# Patient Record
Sex: FEMALE | Race: WHITE | URBAN - METROPOLITAN AREA
[De-identification: names, ages, dates, MRNs, and addresses within clinical notes are randomized per-mention and may not be internally consistent; named-entity substitution may affect disease eponyms.]

---

## 2017-01-04 ENCOUNTER — HOSPITAL ENCOUNTER (OUTPATIENT)
Dept: PHYSICAL THERAPY | Age: 52
Setting detail: THERAPIES SERIES
Discharge: HOME | End: 2017-01-04
Payer: COMMERCIAL

## 2017-01-04 PROCEDURE — 97110 THERAPEUTIC EXERCISES: CPT

## 2017-01-04 PROCEDURE — G8979 MOBILITY GOAL STATUS: HCPCS

## 2017-01-04 PROCEDURE — 97530 THERAPEUTIC ACTIVITIES: CPT

## 2017-01-04 PROCEDURE — G8980 MOBILITY D/C STATUS: HCPCS

## 2017-01-04 ASSESSMENT — PAIN DESCRIPTION - PAIN TYPE: TYPE: ACUTE PAIN

## 2017-01-04 ASSESSMENT — PAIN DESCRIPTION - ORIENTATION: ORIENTATION: RIGHT

## 2017-01-04 ASSESSMENT — PAIN DESCRIPTION - LOCATION: LOCATION: BUTTOCKS;LEG

## 2017-01-04 ASSESSMENT — PAIN SCALES - GENERAL: PAINLEVEL_OUTOF10: 3

## 2023-11-15 ENCOUNTER — OFFICE VISIT (OUTPATIENT)
Dept: ORTHOPEDIC SURGERY | Facility: CLINIC | Age: 58
End: 2023-11-15
Payer: COMMERCIAL

## 2023-11-15 DIAGNOSIS — M25.562 ACUTE PAIN OF LEFT KNEE: ICD-10-CM

## 2023-11-15 DIAGNOSIS — M17.12 PRIMARY OSTEOARTHRITIS OF LEFT KNEE: Primary | ICD-10-CM

## 2023-11-15 PROCEDURE — 2500000005 HC RX 250 GENERAL PHARMACY W/O HCPCS: Performed by: ORTHOPAEDIC SURGERY

## 2023-11-15 PROCEDURE — 2500000004 HC RX 250 GENERAL PHARMACY W/ HCPCS (ALT 636 FOR OP/ED): Performed by: ORTHOPAEDIC SURGERY

## 2023-11-15 PROCEDURE — 20610 DRAIN/INJ JOINT/BURSA W/O US: CPT | Performed by: ORTHOPAEDIC SURGERY

## 2023-11-15 PROCEDURE — L1812 KO ELASTIC W/JOINTS PRE OTS: HCPCS | Performed by: ORTHOPAEDIC SURGERY

## 2023-11-15 RX ORDER — LIDOCAINE HYDROCHLORIDE 10 MG/ML
5 INJECTION INFILTRATION; PERINEURAL
Status: COMPLETED | OUTPATIENT
Start: 2023-11-15 | End: 2023-11-15

## 2023-11-15 RX ORDER — BETAMETHASONE SODIUM PHOSPHATE AND BETAMETHASONE ACETATE 3; 3 MG/ML; MG/ML
12 INJECTION, SUSPENSION INTRA-ARTICULAR; INTRALESIONAL; INTRAMUSCULAR; SOFT TISSUE
Status: COMPLETED | OUTPATIENT
Start: 2023-11-15 | End: 2023-11-15

## 2023-11-15 RX ADMIN — BETAMETHASONE ACETATE AND BETAMETHASONE SODIUM PHOSPHATE 12 MG: 3; 3 INJECTION, SUSPENSION INTRA-ARTICULAR; INTRALESIONAL; INTRAMUSCULAR; SOFT TISSUE at 08:42

## 2023-11-15 RX ADMIN — LIDOCAINE HYDROCHLORIDE 5 ML: 10 INJECTION, SOLUTION INFILTRATION; PERINEURAL at 08:42

## 2023-11-15 NOTE — PROGRESS NOTES
History: Mónica is here for her left knee.  She has pain in the left knee for the last 3 to 4 months.  She denies any acute trauma.  Hurts mainly on the top and outside of the knee.  She was trying some topical medicine but was unable to take anti-inflammatories due to kidney issues.    Past medical history: Multiple  Medications: Multiple  Allergies: No known drug allergies    Please refer to the intake H&P regarding the patient's review of systems, family history and social history as was done today    HEENT: Normal  Lungs: Clear to auscultation  Heart: RRR  Abdomen: Soft, nontender  Skin: clear  Extremity: She has tenderness around the patella especially anteriorly and laterally.  No specific pain over the medial or lateral joint line.  Negative Lachman and posterior drawer.  Mild laxity with varus stress.  Negative Jose Cruz's maneuver.  There is 1+ crepitus with patellofemoral motion.  No numbness.  Contralateral exam is normal for strength, motion, stability and neurovascular assessment.    Radiographs: X-rays and MRI from [facility] show decent joint space and alignment.  There is some moderate patellofemoral wear and fissuring in the lateral facet.  No meniscal or ligamentous injury.    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    Plan: She seems to aggravated her patellofemoral mechanism and does have some patellar DJD.  We discussed several options and she would willing to try a free sport brace and an injection as again she cannot take anti-inflammatories.  We will see her back over the next 6 weeks for recheck if not improving.  We can certainly set her therapy, viscosupplementation or other modalities as needed.  L Inj/Asp: L knee on 11/15/2023 8:42 AM  Indications: pain  Details: 22 G needle, lateral approach  Medications: 5 mL lidocaine 10 mg/mL (1 %); 12 mg betamethasone acet,sod phos 6 mg/mL  Outcome: tolerated well, no immediate complications  Procedure,  treatment alternatives, risks and benefits explained, specific risks discussed. Consent was given by the patient. Immediately prior to procedure a time out was called to verify the correct patient, procedure, equipment, support staff and site/side marked as required. Patient was prepped and draped in the usual sterile fashion.          All questions were answered today with the patient.    This note was generated with voice recognition software and may contain grammatical errors.

## 2023-12-27 ENCOUNTER — APPOINTMENT (OUTPATIENT)
Dept: ORTHOPEDIC SURGERY | Facility: CLINIC | Age: 58
End: 2023-12-27
Payer: COMMERCIAL

## 2024-04-19 ENCOUNTER — CLINICAL SUPPORT (OUTPATIENT)
Dept: AUDIOLOGY | Facility: CLINIC | Age: 59
End: 2024-04-19
Payer: COMMERCIAL

## 2024-04-19 ENCOUNTER — OFFICE VISIT (OUTPATIENT)
Dept: OTOLARYNGOLOGY | Facility: CLINIC | Age: 59
End: 2024-04-19
Payer: COMMERCIAL

## 2024-04-19 VITALS — BODY MASS INDEX: 36.62 KG/M2 | WEIGHT: 199 LBS | HEIGHT: 62 IN

## 2024-04-19 DIAGNOSIS — H90.3 BILATERAL SENSORINEURAL HEARING LOSS: Primary | ICD-10-CM

## 2024-04-19 PROCEDURE — 1036F TOBACCO NON-USER: CPT | Performed by: OTOLARYNGOLOGY

## 2024-04-19 PROCEDURE — 99203 OFFICE O/P NEW LOW 30 MIN: CPT | Performed by: OTOLARYNGOLOGY

## 2024-04-19 PROCEDURE — 92567 TYMPANOMETRY: CPT | Performed by: AUDIOLOGIST

## 2024-04-19 PROCEDURE — 92557 COMPREHENSIVE HEARING TEST: CPT | Performed by: AUDIOLOGIST

## 2024-04-19 NOTE — PROGRESS NOTES
Subjective   Patient ID: Mónica Zavala is a 58 y.o. female who presents for Hearing Loss.    HPI  Patient presents to the office today for assessment of ears and hearing.  Patient with concern for left greater than right hearing loss.  Patient reports symptoms have been gradual but worsened after COVID back in 2020.  Patient is without other ENT related concerns at this time.      Review of Systems   All other systems reviewed and are negative.          Physical Exam  General appearance: No acute distress. Normal facies. Symmetric facial movement. No gross lesions of the face are noted.  No acute distress.  The external ear structures appear normal. The ear canals patent and the tympanic membranes are intact without evidence of air-fluid levels, retraction, or congenital defects.  Anterior rhinoscopy notes essentially a midline nasal septum. Examination is noted for normal healthy mucosal membranes without any evidence of lesions, polyps, or exudate. The tongue is normally mobile. There are no lesions on the gingiva, buccal, or oral mucosa. There are no oral cavity masses.  The neck is negative for mass lymphadenopathy. The trachea and parotid are clear. The thyroid bed is grossly unremarkable. The salivary gland structures are grossly unremarkable.    Audiogram:  Audiogram demonstrates bilateral sensorineural hearing loss.  Right ear with essentially flat mild loss with a mild to moderate loss left.      Assessment/Plan   1.  Bilateral sensorineural hearing loss  Patient seen in the office today for assessment of ears and hearing with bilateral sensorineural hearing loss.  Patient is going to return with Dr. Botello for hearing aid evaluation next week.  Will see her back as needed.

## 2024-04-20 PROBLEM — H90.3 BILATERAL SENSORINEURAL HEARING LOSS: Status: ACTIVE | Noted: 2024-04-20

## 2024-04-20 NOTE — PROGRESS NOTES
Mrs. Zavala, age 58, was seen for a hearing evaluation during her ENT visit with Dr. Morillo to assess hearing concerns, left greater than right which she stated has been worsening ever since having covid in 2020.  She stated difficulty understanding conversation with her family and at work as well as the need to have closed captioning on when watching TV.    Results:  Otoscopy revealed clear ear canals and tympanic membranes were visualized bilaterally.  Tympanometry revealed normal, Type A tympanograms, indicating normal ear canal volume, peak pressure and compliance bilaterally.  Audiometric thresholds revealed a mild sensorineural hearing loss in her right ear and a mild sloping to moderate sensorineural hearing loss in her left ear.  Word recognition scores were excellent bilaterally.    Recommendations:  Follow-up with PCP, Poonam Spicer CNP, as medically directed.  Follow-up with ENT, Dr. Morillo, as medically directed.  Consider binaural amplification.  Retest hearing annually to monitor.

## 2024-04-23 ENCOUNTER — CLINICAL SUPPORT (OUTPATIENT)
Dept: AUDIOLOGY | Facility: CLINIC | Age: 59
End: 2024-04-23
Payer: COMMERCIAL

## 2024-04-23 DIAGNOSIS — H90.3 BILATERAL SENSORINEURAL HEARING LOSS: Primary | ICD-10-CM

## 2024-04-23 NOTE — PROGRESS NOTES
Mrs. Zavala returned today for a hearing aid consultation.  She was last seen in the office 4/19/24 where a hearing evaluation revealed a mild sensorineural hearing loss in her right ear and a mild to moderate sensorineural hearing loss in her left ear.  She has returned today to discuss her options for amplification.    Procedure:  Hearing evaluation results were reviewed.  Hearing aid styles, benefits of amplification and binaural hearing, realistic expectations, differences across levels of technology, rechargeable hearing aids and direct wireless compatibly options were discussed at this appointment.  By the end of the appointment, she requested additional time to consider and research her options.  She will contact the office should further questions arise or should she decide to place an order.

## 2025-05-30 ENCOUNTER — APPOINTMENT (OUTPATIENT)
Dept: RADIOLOGY | Facility: HOSPITAL | Age: 60
End: 2025-05-30
Payer: COMMERCIAL

## 2025-05-30 ENCOUNTER — HOSPITAL ENCOUNTER (EMERGENCY)
Facility: HOSPITAL | Age: 60
Discharge: HOME | End: 2025-05-30
Payer: COMMERCIAL

## 2025-05-30 VITALS
OXYGEN SATURATION: 98 % | BODY MASS INDEX: 40.48 KG/M2 | HEART RATE: 66 BPM | RESPIRATION RATE: 16 BRPM | SYSTOLIC BLOOD PRESSURE: 131 MMHG | HEIGHT: 62 IN | DIASTOLIC BLOOD PRESSURE: 76 MMHG | WEIGHT: 220 LBS | TEMPERATURE: 98.1 F

## 2025-05-30 DIAGNOSIS — S52.134A CLOSED NONDISPLACED FRACTURE OF NECK OF RIGHT RADIUS, INITIAL ENCOUNTER: Primary | ICD-10-CM

## 2025-05-30 PROCEDURE — 73080 X-RAY EXAM OF ELBOW: CPT | Mod: RT

## 2025-05-30 PROCEDURE — 73080 X-RAY EXAM OF ELBOW: CPT | Mod: RIGHT SIDE | Performed by: STUDENT IN AN ORGANIZED HEALTH CARE EDUCATION/TRAINING PROGRAM

## 2025-05-30 PROCEDURE — 2500000004 HC RX 250 GENERAL PHARMACY W/ HCPCS (ALT 636 FOR OP/ED): Performed by: PHYSICIAN ASSISTANT

## 2025-05-30 PROCEDURE — 2500000001 HC RX 250 WO HCPCS SELF ADMINISTERED DRUGS (ALT 637 FOR MEDICARE OP): Performed by: PHYSICIAN ASSISTANT

## 2025-05-30 PROCEDURE — 99283 EMERGENCY DEPT VISIT LOW MDM: CPT | Mod: 25

## 2025-05-30 PROCEDURE — 29105 APPLICATION LONG ARM SPLINT: CPT | Performed by: PHYSICIAN ASSISTANT

## 2025-05-30 RX ORDER — ONDANSETRON 4 MG/1
4 TABLET, ORALLY DISINTEGRATING ORAL EVERY 8 HOURS PRN
Qty: 20 TABLET | Refills: 0 | Status: SHIPPED | OUTPATIENT
Start: 2025-05-30 | End: 2025-06-06

## 2025-05-30 RX ORDER — OXYCODONE AND ACETAMINOPHEN 5; 325 MG/1; MG/1
1 TABLET ORAL EVERY 6 HOURS PRN
Qty: 9 TABLET | Refills: 0 | Status: SHIPPED | OUTPATIENT
Start: 2025-05-30 | End: 2025-06-02

## 2025-05-30 RX ORDER — ONDANSETRON 4 MG/1
4 TABLET, ORALLY DISINTEGRATING ORAL ONCE
Status: COMPLETED | OUTPATIENT
Start: 2025-05-30 | End: 2025-05-30

## 2025-05-30 RX ORDER — OXYCODONE HYDROCHLORIDE 5 MG/1
5 TABLET ORAL ONCE
Refills: 0 | Status: COMPLETED | OUTPATIENT
Start: 2025-05-30 | End: 2025-05-30

## 2025-05-30 RX ADMIN — ONDANSETRON 4 MG: 4 TABLET, ORALLY DISINTEGRATING ORAL at 20:47

## 2025-05-30 RX ADMIN — OXYCODONE 5 MG: 5 TABLET ORAL at 20:47

## 2025-05-30 RX ADMIN — DEXAMETHASONE 10 MG: 4 TABLET ORAL at 20:47

## 2025-05-30 ASSESSMENT — PAIN DESCRIPTION - PAIN TYPE
TYPE: ACUTE PAIN
TYPE: ACUTE PAIN

## 2025-05-30 ASSESSMENT — COLUMBIA-SUICIDE SEVERITY RATING SCALE - C-SSRS
1. IN THE PAST MONTH, HAVE YOU WISHED YOU WERE DEAD OR WISHED YOU COULD GO TO SLEEP AND NOT WAKE UP?: NO
2. HAVE YOU ACTUALLY HAD ANY THOUGHTS OF KILLING YOURSELF?: NO
6. HAVE YOU EVER DONE ANYTHING, STARTED TO DO ANYTHING, OR PREPARED TO DO ANYTHING TO END YOUR LIFE?: NO

## 2025-05-30 ASSESSMENT — PAIN - FUNCTIONAL ASSESSMENT
PAIN_FUNCTIONAL_ASSESSMENT: 0-10

## 2025-05-30 ASSESSMENT — PAIN DESCRIPTION - LOCATION
LOCATION: ARM

## 2025-05-30 ASSESSMENT — PAIN SCALES - GENERAL
PAINLEVEL_OUTOF10: 0 - NO PAIN
PAINLEVEL_OUTOF10: 3
PAINLEVEL_OUTOF10: 7

## 2025-05-30 ASSESSMENT — PAIN DESCRIPTION - DESCRIPTORS
DESCRIPTORS: SHOOTING
DESCRIPTORS: DULL

## 2025-05-30 ASSESSMENT — PAIN DESCRIPTION - ORIENTATION
ORIENTATION: RIGHT

## 2025-05-30 ASSESSMENT — PAIN DESCRIPTION - FREQUENCY: FREQUENCY: INTERMITTENT

## 2025-05-30 NOTE — Clinical Note
Mónica Zavlaa was seen and treated in our emergency department on 5/30/2025.  She may return to work on 06/03/2025.       If you have any questions or concerns, please don't hesitate to call.      Kendrick Pardo PA-C

## 2025-05-31 NOTE — ED PROVIDER NOTES
HPI   Chief Complaint   Patient presents with   • Fall   • Arm Injury     I was in [place] last night and fell on my hands and knees, but I injured my right arm. Denies LOC, denies blood thinners.       This is a 59-year-old female presents emergency room chief complaint of acute injury to her right elbow.  The patient states she was on vacation [place] and last night tripped over uneven pavement and landed on outstretched arms injuring her right elbow.  Patient reports persistent pain and swelling to the right elbow which is worse with movement.  She denies any numbness or tingling distally.  She took Tylenol for her pain with little relief.      History provided by:  Patient and medical records          Patient History   Medical History[1]  Surgical History[2]  Family History[3]  Social History[4]    Physical Exam   ED Triage Vitals [05/30/25 1942]   Temperature Heart Rate Respirations BP   36.8 °C (98.2 °F) 82 16 175/79      Pulse Ox Temp Source Heart Rate Source Patient Position   99 % Temporal Monitor Sitting      BP Location FiO2 (%)     Left arm --       Physical Exam  Vitals and nursing note reviewed.   Constitutional:       General: She is awake. She is not in acute distress.     Appearance: Normal appearance. She is well-developed and well-groomed. She is not ill-appearing, toxic-appearing or diaphoretic.   HENT:      Head: Normocephalic and atraumatic.   Cardiovascular:      Rate and Rhythm: Normal rate and regular rhythm.      Pulses: Normal pulses.   Pulmonary:      Effort: Pulmonary effort is normal.      Breath sounds: Normal breath sounds.   Musculoskeletal:         General: Tenderness present.      Right shoulder: Normal.      Left shoulder: Normal.      Right upper arm: Normal.      Left upper arm: Normal.      Right elbow: No swelling, deformity, effusion or lacerations. Decreased range of motion. Tenderness present in radial head and lateral epicondyle. No medial epicondyle or olecranon process  tenderness.      Right forearm: Normal.      Left forearm: Normal.      Right wrist: Normal.      Left wrist: Normal.      Right hand: Normal.      Left hand: Normal.      Cervical back: Normal range of motion and neck supple.   Skin:     General: Skin is warm and dry.      Findings: No bruising or erythema.   Neurological:      General: No focal deficit present.      Mental Status: She is alert and oriented to person, place, and time. Mental status is at baseline.      Sensory: No sensory deficit.   Psychiatric:         Mood and Affect: Mood normal.         Behavior: Behavior normal. Behavior is cooperative.         Thought Content: Thought content normal.         Judgment: Judgment normal.           ED Course & MDM   Diagnoses as of 05/30/25 2227   Closed nondisplaced fracture of neck of right radius, initial encounter                 No data recorded     [place] Coma Scale Score: 15 (05/30/25 1950 : Lianne Nieves RN)                           Medical Decision Making  This is a 59-year-old female presents emergency room chief complaint of acute injury to her right elbow.  The patient states she was on vacation [place] and last night tripped over uneven pavement and landed on outstretched arms injuring her right elbow.  Patient reports persistent pain and swelling to the right elbow which is worse with movement.  She denies any numbness or tingling distally.  She took Tylenol for her pain with little relief.  Temperature 36.8, heart rate 82, respirations 16, blood pressure 175/79 pulse ox is 99% on room air  The patient was medicated with Decadron 10 mg p.o., Zofran 4 mg ODT, oxycodone 5 mg p.o.  X-ray shows an acute minimally impacted radial neck fracture.  There is joint effusion, the radiocapitellar joint and ulnotrochlear joint are normally aligned.  I discussed results of workup with the patient.  She was discharged home with prescription for Percocet, Zofran and will follow-up with the [facility] for  orthopedics on Monday.  She was advised to return back to the ER sooner with any concerns or worsening of symptoms.  Patient verbalizes understanding and agreement with the plan disposition all questions answered prior to discharge        Procedure  Splint Application    Performed by: Kendrick Pardo PA-C  Authorized by: Kendrick Pardo PA-C    Consent:     Consent obtained:  Verbal    Consent given by:  Patient    Risks, benefits, and alternatives were discussed: yes      Risks discussed:  Discoloration, numbness, pain and swelling    Alternatives discussed:  No treatment  Universal protocol:     Procedure explained and questions answered to patient or proxy's satisfaction: yes      Imaging studies available: yes      Patient identity confirmed:  Verbally with patient and arm band  Pre-procedure details:     Distal neurologic exam:  Normal    Distal perfusion: distal pulses strong and brisk capillary refill    Procedure details:     Location:  Elbow    Elbow location:  R elbow    Splint type:  Long arm    Supplies:  Elastic bandage, cotton padding, fiberglass and sling    Attestation: Splint applied and adjusted personally by me    Post-procedure details:     Distal neurologic exam:  Normal    Distal perfusion: distal pulses strong and brisk capillary refill      Procedure completion:  Tolerated well, no immediate complications    Post-procedure imaging: not applicable               [1]  History reviewed. No pertinent past medical history.  [2]  History reviewed. No pertinent surgical history.  [3]  No family history on file.  [4]  Social History  Tobacco Use   • Smoking status: Never   • Smokeless tobacco: Never   Substance Use Topics   • Alcohol use: Not on file   • Drug use: Not on file      Kendrick Pardo PA-C  05/30/25 3320

## 2025-06-02 ENCOUNTER — OFFICE VISIT (OUTPATIENT)
Dept: ORTHOPEDIC SURGERY | Facility: CLINIC | Age: 60
End: 2025-06-02
Payer: COMMERCIAL

## 2025-06-02 DIAGNOSIS — S52.134A CLOSED NONDISPLACED FRACTURE OF NECK OF RIGHT RADIUS, INITIAL ENCOUNTER: Primary | ICD-10-CM

## 2025-06-02 PROCEDURE — 99214 OFFICE O/P EST MOD 30 MIN: CPT | Performed by: STUDENT IN AN ORGANIZED HEALTH CARE EDUCATION/TRAINING PROGRAM

## 2025-06-02 PROCEDURE — 24650 CLTX RDL HEAD/NCK FX WO MNPJ: CPT | Performed by: STUDENT IN AN ORGANIZED HEALTH CARE EDUCATION/TRAINING PROGRAM

## 2025-06-02 PROCEDURE — 99212 OFFICE O/P EST SF 10 MIN: CPT | Performed by: STUDENT IN AN ORGANIZED HEALTH CARE EDUCATION/TRAINING PROGRAM

## 2025-06-02 PROCEDURE — L3670 SO ACRO/CLAV CAN WEB PRE OTS: HCPCS | Performed by: STUDENT IN AN ORGANIZED HEALTH CARE EDUCATION/TRAINING PROGRAM

## 2025-06-02 NOTE — PROGRESS NOTES
"  Acute Injury Established Patient Visit    Patient ID: Mónica Zavala \"\" is a 59 y.o. female  History of Present Illness  The patient is a 59-year-old female with a right arm injury.    Right Arm Injury  - On 05/29/2025, she tripped on an uneven sidewalk in [place], [place], and fell forward onto all fours, resulting in a minimally impacted radial neck fracture and small joint effusion.  - X-rays at [facility] showed no other acute fractures or dislocations.  - She reports no numbness or tingling.  - She is right-handed, works in HR, and has difficulty using the ER-provided sling as her arm slides out.    SOCIAL HISTORY  - Works in HR       Assessment & Plan  1. Right arm injury:  - Minimally impacted radial neck fracture confirmed by x-rays on 05/29/2025  - Work note provided for return on 06/03/2025  - Provide a better-fitting sling for 3-5 days  - Begin gentle range of motion exercises after sling use  - Avoid weightbearing for 3 weeks  - Resume driving in a few days using the left hand  - Follow up in 3 weeks for repeat x-rays       Assessment:   Problem List Items Addressed This Visit       Closed nondisplaced fracture of neck of right radius - Primary    Relevant Orders    Sling       Diagnostics: Reviewed all relevant imaging including x-ray, MRI, CT, and US.    Results  X-ray of the right arm (05/29/2025): Minimally impacted radial neck fracture, small joint effusion, no other acute fractures or dislocations.       Procedure:  Procedures    Physical Exam  - Musculoskeletal:  Exam of the right elbow:  Skin is intact with no signs of infection;  No swelling or bruising;  No erythema or warmth;  TENDER and swollen over the radial head with painful supination and extension  No tenderness overlying the lateral epicondyle;  No tenderness overlying the medial epicondyle;  No pain with resisted extension at the wrist;  No pain with supination;  No pain with flexion;  Negative hook test;    - Right arm: Mild " swelling    - Right wrist: No pain on palpation       Orders Placed This Encounter    Sling      At the conclusion of the visit there were no further questions by the patient/family regarding their plan of care.  Patient was instructed to call or return with any issues, questions, or concerns regarding their injury and/or treatment plan described above.     06/02/25 at 10:30 AM - Noe Cleary DO    Office: [phone]    This note was prepared using voice recognition software.  The details of this note are correct and have been reviewed, and corrected to the best of my ability.  Some grammatical errors may persist related to the Dragon software.  This medical note was created with the assistance of artificial intelligence (AI) for documentation purposes. The content has been reviewed and confirmed by the healthcare provider for accuracy and completeness. Patient consented to the use of audio recording and use of AI during their visit.

## 2025-06-02 NOTE — LETTER
Psycho/Social Assessment:     ***    Prescription Coverage? ***  Vocational Status: (current): {Vocational Status:42299}  Voc. Ed. Referral: {Response; yes/no/na:63}    SW: PRECIOUS ABDI  Date/Time: 6/2/25 at 8:43 AM

## 2025-06-02 NOTE — LETTER
June 2, 2025     Patient: Mónica Zavala   YOB: 1965   Date of Visit: 6/2/2025       To Whom It May Concern:    It is my medical opinion that Mónica Zavala should remain out of work until tomorrow, 06/03/25.    If you have any questions or concerns, please don't hesitate to call.         Sincerely,        Noe Cleary, DO

## 2025-06-02 NOTE — LETTER
June 2, 2025     Patient: Mónica Zavala   YOB: 1965   Date of Visit: 6/2/2025       To Whom It May Concern:    It is my medical opinion that Mónica Zavala may return to work on 5/3/2023.    If you have any questions or concerns, please don't hesitate to call.         Sincerely,        Noe Cleary,     CC: No Recipients

## 2025-06-20 ENCOUNTER — HOSPITAL ENCOUNTER (OUTPATIENT)
Dept: RADIOLOGY | Facility: HOSPITAL | Age: 60
Discharge: HOME | End: 2025-06-20
Payer: COMMERCIAL

## 2025-06-20 ENCOUNTER — APPOINTMENT (OUTPATIENT)
Dept: ORTHOPEDIC SURGERY | Facility: CLINIC | Age: 60
End: 2025-06-20
Payer: COMMERCIAL

## 2025-06-20 DIAGNOSIS — S52.134A CLOSED NONDISPLACED FRACTURE OF NECK OF RIGHT RADIUS, INITIAL ENCOUNTER: ICD-10-CM

## 2025-06-20 PROCEDURE — 73080 X-RAY EXAM OF ELBOW: CPT | Mod: RT

## 2025-06-20 PROCEDURE — 73080 X-RAY EXAM OF ELBOW: CPT | Mod: RIGHT SIDE | Performed by: RADIOLOGY

## 2025-06-20 NOTE — PROGRESS NOTES
"      Follow-Up Patient Visit  Patient ID: Mónica Zavala \"\" is a 59 y.o. female.    History of Present Illness  The patient is a 59-year-old female who presents for follow-up of her right arm injury, specifically a minimally impacted radial neck fracture. Repeat x-rays were performed today.    She reports an improvement in her condition, having been out of the sling for approximately 1.5 weeks. She has been actively engaging in range of motion exercises and can almost fully extend her arm. However, she experiences difficulty with supination. She has been cautious about weight-bearing activities, limiting herself to lifting only her phone. She has also initiated discussions with a therapy assistant regarding her progress.    Assessment & Plan  1. Right arm injury.  The x-ray results indicate a minimally impacted radial neck fracture with signs of healing, including slight callus formation, increased sclerosis, and mild trabecular bridging. There is no significant change in alignment. She has been advised to continue her range of motion exercises and gradually introduce weight-bearing activities. Initially, she should start with 2 to 3 pounds within a week to 10 days, and by the 4-week brad, she can increase to 5 pounds. Occupational therapy will be initiated to assist with range of motion and strength training. A follow-up appointment is scheduled for 3 weeks from now, during which quick x-rays will be taken to assess progress.    Follow-up  The patient will follow up in 3 weeks with repeat x-rays of the right elbow, 3 view       Orders Placed This Encounter    XR elbow right 3+ views       1. Closed nondisplaced fracture of neck of right radius, initial encounter        Procedures    Physical Exam  Right arm was examined.    Results  Imaging  X-rays of the right arm show no significant change in alignment, mild impaction with some interval signs of healing, slight callus formation, increased sclerosis and mild " trabecular bridging noted.    At the conclusion of the visit there were no further questions by the patient/family regarding their plan of care.  Patient was instructed to call or return with any issues, questions, or concerns regarding their injury and/or treatment plan described above.      This medical note was created with the assistance of artificial intelligence (AI) for documentation purposes. The content has been reviewed and confirmed by the healthcare provider for accuracy and completeness. Patient consented to the use of audio recording and use of AI during their visit.   06/20/25 at 9:12 AM - Noe Cleary DO  Office:  [phone]

## 2025-07-11 ENCOUNTER — APPOINTMENT (OUTPATIENT)
Dept: ORTHOPEDIC SURGERY | Facility: CLINIC | Age: 60
End: 2025-07-11
Payer: COMMERCIAL

## 2025-07-11 ENCOUNTER — HOSPITAL ENCOUNTER (OUTPATIENT)
Dept: RADIOLOGY | Facility: HOSPITAL | Age: 60
Discharge: HOME | End: 2025-07-11
Payer: COMMERCIAL

## 2025-07-11 DIAGNOSIS — S52.134A CLOSED NONDISPLACED FRACTURE OF NECK OF RIGHT RADIUS, INITIAL ENCOUNTER: ICD-10-CM

## 2025-07-11 PROCEDURE — 73080 X-RAY EXAM OF ELBOW: CPT | Mod: RIGHT SIDE | Performed by: RADIOLOGY

## 2025-07-11 PROCEDURE — 73080 X-RAY EXAM OF ELBOW: CPT | Mod: RT

## 2025-07-11 NOTE — PROGRESS NOTES
"      Follow-Up Patient Visit  Patient ID: Mónica Zavala \"\" is a 59 y.o. female.    History of Present Illness  The patient is a 59-year-old female who presents for follow-up of her right minimally impacted radial neck fracture. She was initially seen on 06/02/2025 for this injury and was last seen on 06/20/2025, making her 5 to 6 weeks out from the initial visit.    She reports an improvement in her condition, although she has not fully recovered yet. Over the past 3 weeks, she has been undergoing therapy, which has been beneficial. She has also been performing exercises twice daily and applying ice to the area. She has not sustained any new injuries.    SOCIAL HISTORY  Exercise: Performs exercises twice a day.    Assessment & Plan  1. Right minimally impacted radial neck fracture:  X-rays show some interval signs of healing. She is 5 to 6 weeks out from the initial injury. Supination is within 1 to 2 degrees of full extension. Flexion is good. Pronation is not bad. There is still some tenderness and pain, but the wrist is doing okay. Therapy has been beneficial, including cranial therapy to reduce swelling and ultrasound treatments. Active range of motion exercises are performed twice daily. She is advised to continue with her current therapy regimen, including active range of motion exercises. Progression into weightbearing up to 5 to 7 pounds is recommended before the next visit.    Follow-up: The patient will follow up in 1 month with repeat x-rays of the right elbow.       Orders Placed This Encounter    XR elbow right 3+ views       1. Closed nondisplaced fracture of neck of right radius, initial encounter        Procedures    Physical Exam  Musculoskeletal:  Right arm: Supination is within 1 to 2 degrees of full extension. Flexion is good. Pronation is not bad. Mild tenderness noted.    Results  Imaging   - X-ray of the right radial neck: 06/20/2025, Some interval signs of healing    At the conclusion " of the visit there were no further questions by the patient/family regarding their plan of care.  Patient was instructed to call or return with any issues, questions, or concerns regarding their injury and/or treatment plan described above.      This medical note was created with the assistance of artificial intelligence (AI) for documentation purposes. The content has been reviewed and confirmed by the healthcare provider for accuracy and completeness. Patient consented to the use of audio recording and use of AI during their visit.   07/11/25 at 11:55 AM - Noe Cleary DO  Office:  [phone]

## 2025-08-11 ENCOUNTER — HOSPITAL ENCOUNTER (OUTPATIENT)
Dept: RADIOLOGY | Facility: HOSPITAL | Age: 60
Discharge: HOME | End: 2025-08-11
Payer: COMMERCIAL

## 2025-08-11 ENCOUNTER — APPOINTMENT (OUTPATIENT)
Dept: ORTHOPEDIC SURGERY | Facility: CLINIC | Age: 60
End: 2025-08-11
Payer: COMMERCIAL

## 2025-08-11 DIAGNOSIS — S52.134A CLOSED NONDISPLACED FRACTURE OF NECK OF RIGHT RADIUS, INITIAL ENCOUNTER: ICD-10-CM

## 2025-08-11 PROCEDURE — 73080 X-RAY EXAM OF ELBOW: CPT | Mod: RT

## 2025-08-11 PROCEDURE — 99024 POSTOP FOLLOW-UP VISIT: CPT | Performed by: STUDENT IN AN ORGANIZED HEALTH CARE EDUCATION/TRAINING PROGRAM

## 2025-08-11 PROCEDURE — 73080 X-RAY EXAM OF ELBOW: CPT | Mod: RIGHT SIDE | Performed by: RADIOLOGY
